# Patient Record
Sex: FEMALE | Race: WHITE | NOT HISPANIC OR LATINO
[De-identification: names, ages, dates, MRNs, and addresses within clinical notes are randomized per-mention and may not be internally consistent; named-entity substitution may affect disease eponyms.]

---

## 2017-02-17 PROBLEM — Z00.00 ENCOUNTER FOR PREVENTIVE HEALTH EXAMINATION: Status: ACTIVE | Noted: 2017-02-17

## 2017-03-01 ENCOUNTER — APPOINTMENT (OUTPATIENT)
Dept: SURGERY | Facility: CLINIC | Age: 67
End: 2017-03-01

## 2017-03-07 ENCOUNTER — OUTPATIENT (OUTPATIENT)
Dept: OUTPATIENT SERVICES | Facility: HOSPITAL | Age: 67
LOS: 1 days | End: 2017-03-07

## 2017-03-07 VITALS
SYSTOLIC BLOOD PRESSURE: 150 MMHG | RESPIRATION RATE: 16 BRPM | HEART RATE: 76 BPM | DIASTOLIC BLOOD PRESSURE: 78 MMHG | HEIGHT: 70 IN | TEMPERATURE: 99 F | WEIGHT: 293 LBS

## 2017-03-07 DIAGNOSIS — N64.52 NIPPLE DISCHARGE: ICD-10-CM

## 2017-03-07 DIAGNOSIS — K21.9 GASTRO-ESOPHAGEAL REFLUX DISEASE WITHOUT ESOPHAGITIS: ICD-10-CM

## 2017-03-07 DIAGNOSIS — Z01.818 ENCOUNTER FOR OTHER PREPROCEDURAL EXAMINATION: ICD-10-CM

## 2017-03-07 DIAGNOSIS — K60.0 ACUTE ANAL FISSURE: Chronic | ICD-10-CM

## 2017-03-07 DIAGNOSIS — Z90.49 ACQUIRED ABSENCE OF OTHER SPECIFIED PARTS OF DIGESTIVE TRACT: Chronic | ICD-10-CM

## 2017-03-07 DIAGNOSIS — Z98.890 OTHER SPECIFIED POSTPROCEDURAL STATES: Chronic | ICD-10-CM

## 2017-03-07 DIAGNOSIS — I10 ESSENTIAL (PRIMARY) HYPERTENSION: ICD-10-CM

## 2017-03-07 DIAGNOSIS — E66.9 OBESITY, UNSPECIFIED: ICD-10-CM

## 2017-03-07 DIAGNOSIS — E11.9 TYPE 2 DIABETES MELLITUS WITHOUT COMPLICATIONS: ICD-10-CM

## 2017-03-07 DIAGNOSIS — S02.401B: Chronic | ICD-10-CM

## 2017-03-07 NOTE — H&P PST ADULT - PMH
DM (diabetes mellitus)  Type II  GERD (gastroesophageal reflux disease)    HTN (hypertension)    Hyperlipidemia    OAB (overactive bladder)    Obesity  Morbid  Psoriatic arthritis  Elbows, fingers

## 2017-03-07 NOTE — H&P PST ADULT - NEGATIVE ENMT SYMPTOMS
no sinus symptoms/no nose bleeds/no vertigo/no nasal discharge/no tinnitus/no ear pain/no nasal congestion/no hearing difficulty/no abnormal taste sensation

## 2017-03-07 NOTE — H&P PST ADULT - HISTORY OF PRESENT ILLNESS
68 y/o obese female with hx HTN, Hyperlipidemia, DM Type II, Gerd, presents for left breast major duct excision on 03/14/17. Pt states she was on vacation last December when she noticed left nipple discharge. Pt had Mammogram, sonogram, & MRI done. Pt was referred for further evaluation, seen by Dr Matute & surgery recommended.

## 2017-03-07 NOTE — H&P PST ADULT - PROBLEM SELECTOR PLAN 1
scheduled for left breast major duct excision  Pending medical clearance  fro pcp  preop instructions & surgical scrub given & explained, pt verbalized understanding

## 2017-03-07 NOTE — H&P PST ADULT - NEGATIVE CARDIOVASCULAR SYMPTOMS
no palpitations/no orthopnea/no chest pain/no paroxysmal nocturnal dyspnea/no claudication/no peripheral edema/no dyspnea on exertion

## 2017-03-07 NOTE — H&P PST ADULT - FAMILY HISTORY
<<-----Click on this checkbox to enter Family History Family history of premature CAD     Father  Still living? No  Family history of early CAD, Age at diagnosis: 41-50     Mother  Still living? Yes, Estimated age: Age Unknown  Family history of sarcoidosis, Age at diagnosis: Age Unknown  Family history of diabetes mellitus (DM), Age at diagnosis: Age Unknown

## 2017-03-07 NOTE — H&P PST ADULT - RS GEN PE MLT RESP DETAILS PC
clear to auscultation bilaterally/breath sounds equal/good air movement/airway patent/no rales/respirations non-labored/no wheezes/no rhonchi

## 2017-03-07 NOTE — H&P PST ADULT - PSH
Acute anal fissure  Repair   delivery delivered  x 2  History of D&C  x 2  Open maxillary fracture  Osteotomy - 44 years ago  S/P laparoscopic cholecystectomy

## 2017-03-07 NOTE — H&P PST ADULT - NSANTHOSAYNRD_GEN_A_CORE
No. CODY screening performed.  STOP BANG Legend: 0-2 = LOW Risk; 3-4 = INTERMEDIATE Risk; 5-8 = HIGH Risk

## 2017-03-20 ENCOUNTER — RESULT REVIEW (OUTPATIENT)
Age: 67
End: 2017-03-20

## 2017-03-20 RX ORDER — SODIUM CHLORIDE 9 MG/ML
1000 INJECTION, SOLUTION INTRAVENOUS
Qty: 0 | Refills: 0 | Status: DISCONTINUED | OUTPATIENT
Start: 2017-03-21 | End: 2017-04-05

## 2017-03-21 ENCOUNTER — OUTPATIENT (OUTPATIENT)
Dept: OUTPATIENT SERVICES | Facility: HOSPITAL | Age: 67
LOS: 1 days | Discharge: HOME | End: 2017-03-21
Payer: COMMERCIAL

## 2017-03-21 ENCOUNTER — APPOINTMENT (OUTPATIENT)
Dept: SURGERY | Facility: HOSPITAL | Age: 67
End: 2017-03-21

## 2017-03-21 VITALS
WEIGHT: 293 LBS | HEIGHT: 70 IN | HEART RATE: 88 BPM | OXYGEN SATURATION: 96 % | TEMPERATURE: 98 F | DIASTOLIC BLOOD PRESSURE: 70 MMHG | RESPIRATION RATE: 16 BRPM | SYSTOLIC BLOOD PRESSURE: 160 MMHG

## 2017-03-21 VITALS
OXYGEN SATURATION: 95 % | TEMPERATURE: 98 F | HEART RATE: 43 BPM | RESPIRATION RATE: 17 BRPM | DIASTOLIC BLOOD PRESSURE: 77 MMHG | SYSTOLIC BLOOD PRESSURE: 114 MMHG

## 2017-03-21 DIAGNOSIS — S02.401B: Chronic | ICD-10-CM

## 2017-03-21 DIAGNOSIS — N64.52 NIPPLE DISCHARGE: ICD-10-CM

## 2017-03-21 DIAGNOSIS — Z90.49 ACQUIRED ABSENCE OF OTHER SPECIFIED PARTS OF DIGESTIVE TRACT: Chronic | ICD-10-CM

## 2017-03-21 DIAGNOSIS — Z98.890 OTHER SPECIFIED POSTPROCEDURAL STATES: Chronic | ICD-10-CM

## 2017-03-21 DIAGNOSIS — K60.0 ACUTE ANAL FISSURE: Chronic | ICD-10-CM

## 2017-03-21 PROCEDURE — 19120K: CUSTOM | Mod: LT

## 2017-03-21 PROCEDURE — 88305 TISSUE EXAM BY PATHOLOGIST: CPT | Mod: 26

## 2017-03-21 RX ORDER — METHOTREXATE 2.5 MG/1
7 TABLET ORAL
Qty: 0 | Refills: 0 | COMMUNITY

## 2017-03-21 RX ORDER — SODIUM CHLORIDE 9 MG/ML
1000 INJECTION, SOLUTION INTRAVENOUS
Qty: 0 | Refills: 0 | Status: DISCONTINUED | OUTPATIENT
Start: 2017-03-21 | End: 2017-03-21

## 2017-03-21 RX ORDER — ACETAMINOPHEN 500 MG
2 TABLET ORAL
Qty: 0 | Refills: 0 | COMMUNITY

## 2017-03-21 RX ORDER — SITAGLIPTIN 50 MG/1
1 TABLET, FILM COATED ORAL
Qty: 0 | Refills: 0 | COMMUNITY

## 2017-03-21 RX ORDER — ROSUVASTATIN CALCIUM 5 MG/1
1 TABLET ORAL
Qty: 0 | Refills: 0 | COMMUNITY

## 2017-03-21 RX ORDER — CANDESARTAN CILEXETIL 8 MG/1
1 TABLET ORAL
Qty: 0 | Refills: 0 | COMMUNITY

## 2017-03-21 RX ORDER — OMEPRAZOLE 10 MG/1
1 CAPSULE, DELAYED RELEASE ORAL
Qty: 0 | Refills: 0 | COMMUNITY

## 2017-03-21 RX ORDER — SOLIFENACIN SUCCINATE 10 MG/1
1 TABLET ORAL
Qty: 0 | Refills: 0 | COMMUNITY

## 2017-03-21 RX ORDER — METFORMIN HYDROCHLORIDE 850 MG/1
1 TABLET ORAL
Qty: 0 | Refills: 0 | COMMUNITY

## 2017-03-21 RX ORDER — FOLIC ACID 0.8 MG
1 TABLET ORAL
Qty: 0 | Refills: 0 | COMMUNITY

## 2017-03-21 RX ADMIN — SODIUM CHLORIDE 30 MILLILITER(S): 9 INJECTION, SOLUTION INTRAVENOUS at 14:23

## 2017-03-21 NOTE — ASU DISCHARGE PLAN (ADULT/PEDIATRIC). - MEDICATION SUMMARY - MEDICATIONS TO TAKE
I will START or STAY ON the medications listed below when I get home from the hospital:    Tylenol Arthritis Caplet  -- 2 tab(s) by mouth 2 times a day. AM / PM  -- Indication: For home med    candesartan 32 mg oral tablet  -- 1 tab(s) by mouth once a day. AM  -- Indication: For home med    Januvia 100 mg oral tablet  -- 1 tab(s) by mouth once a day. AM  -- Indication: For home med    metFORMIN 500 mg oral tablet  -- 1 tab(s) by mouth 2 times a day  -- Indication: For home med    Crestor 5 mg oral tablet  -- 1 tab(s) by mouth once a day (at bedtime)  -- Indication: For home med    Ziac 10 mg-6.25 mg oral tablet  -- 1 tab(s) by mouth once a day.  AM  -- Indication: For home med    methotrexate 2.5 mg oral tablet  -- 7 tab(s) by mouth once a week. Tuesdays  -- Indication: For home med    omeprazole 20 mg oral delayed release capsule  -- 1 cap(s) by mouth once a day. HS  -- Indication: For home med    VESIcare 5 mg oral tablet  -- 1 tab(s) by mouth once a day. AM  -- Indication: For home med    multivitamin  -- 1 tab(s) by mouth once a day. Last day 3/7/17  -- Indication: For home med    folic acid 1 mg oral tablet  -- 1 tab(s) by mouth once a day  -- Indication: For home med

## 2017-03-21 NOTE — ASU DISCHARGE PLAN (ADULT/PEDIATRIC). - NURSING INSTRUCTIONS
Wear bra for comfort and support (24/7). Use ice to reduce pain and swelling. Remove dressing as instructed by your doctor. Leave steri strips intact.

## 2017-03-21 NOTE — ASU DISCHARGE PLAN (ADULT/PEDIATRIC). - NOTIFY
Fever greater than 101/Bleeding that does not stop Swelling that continues/Pain not relieved by Medications/Increased Irritability or Sluggishness/Inability to Tolerate Liquids or Foods/Excessive Diarrhea/Persistent Nausea and Vomiting/Unable to Urinate/Bleeding that does not stop/Numbness, color, or temperature change to extremity/Fever greater than 101/Numbness, tingling

## 2017-03-27 ENCOUNTER — APPOINTMENT (OUTPATIENT)
Dept: SURGERY | Facility: CLINIC | Age: 67
End: 2017-03-27

## 2017-05-10 ENCOUNTER — APPOINTMENT (OUTPATIENT)
Dept: SURGERY | Facility: CLINIC | Age: 67
End: 2017-05-10

## 2019-11-08 NOTE — ASU PREOP CHECKLIST - VERIFY SURGICAL SITE/SIDE WITH PATIENT

## 2019-11-24 NOTE — H&P PST ADULT - NSANTHAGERD_ENT_A_CORE
Allan Cortes MD  Go to the Emergency Department for any problems  Call your PCP for follow up next available.    Acute Otitis Media with Infection (Child)    Your child has a middle ear infection (acute otitis media). It is caused by bacteria or fungi. The middle ear is the space behind the eardrum. The eustachian tube connects the ear to the nasal passage. The eustachian tubes help drain fluid from the ears. They also keep the air pressure equal inside and outside the ears. These tubes are shorter and more horizontal in children. This makes it more likely for the tubes to become blocked. A blockage lets fluid and pressure build up in the middle ear. Bacteria or fungi can grow in this fluid and cause an ear infection. This infection is commonly known as an earache.  The main symptom of an ear infection is ear pain. Other symptoms may include pulling at the ear, being more fussy than usual, decreased appetite, and vomiting or diarrhea. Your childs hearing may also be affected. Your child may have had a respiratory infection first.  An ear infection may clear up on its own. Or your child may need to take medicine. After the infection goes away, your child may still have fluid in the middle ear. It may take weeks or months for this fluid to go away. During that time, your child may have temporary hearing loss. But all other symptoms of the earache should be gone.  Home care  Follow these guidelines when caring for your child at home:  · The healthcare provider will likely prescribe medicines for pain. The provider may also prescribe antibiotics or antifungals to treat the infection. These may be liquid medicines to give by mouth. Or they may be ear drops. Follow the providers instructions for giving these medicines to your child.  · Because ear infections can clear up on their own, the provider may suggest waiting for a few days before giving your child medicines for infection.  · To reduce pain, have your child  rest in an upright position. Hot or cold compresses held against the ear may help ease pain.  · Keep the ear dry. Have your child wear a shower cap when bathing.  To help prevent future infections:  · Avoid smoking near your child. Secondhand smoke raises the risk for ear infections in children.  · Make sure your child gets all appropriate vaccines.  · Do not bottle-feed while your baby is lying on his or her back. (This position can cause middle ear infections because it allows milk to run into the eustachian tubes.)      · If you breastfeed, continue until your child is 6 to 12 months of age.  To apply ear drops:  1. Put the bottle in warm water if the medicine is kept in the refrigerator. Cold drops in the ear are uncomfortable.  2. Have your child lie down on a flat surface. Gently hold your childs head to one side.  3. Remove any drainage from the ear with a clean tissue or cotton swab. Clean only the outer ear. Dont put the cotton swab into the ear canal.  4. Straighten the ear canal by gently pulling the earlobe up and back.  5. Keep the dropper a half-inch above the ear canal. This will keep the dropper from becoming contaminated. Put the drops against the side of the ear canal.  6. Have your child stay lying down for 2 to 3 minutes. This gives time for the medicine to enter the ear canal. If your child doesnt have pain, gently massage the outer ear near the opening.  7. Wipe any extra medicine away from the outer ear with a clean cotton ball.  Follow-up care  Follow up with your childs healthcare provider as directed. Your child will need to have the ear rechecked to make sure the infection has resolved. Check with your doctor to see when they want to see your child.  Special note to parents  If your child continues to get earaches, he or she may need ear tubes. The provider will put small tubes in your childs eardrum to help keep fluid from building up. This procedure is a simple and works well.  When  to seek medical advice  Unless advised otherwise, call your child's healthcare provider if:  · Your child is 3 months old or younger and has a fever of 100.4°F (38°C) or higher. Your child may need to see a healthcare provider.  · Your child is of any age and has fevers higher than 104°F (40°C) that come back again and again.  Call your child's healthcare provider for any of the following:  · New symptoms, especially swelling around the ear or weakness of face muscles  · Severe pain  · Infection seems to get worse, not better   · Neck pain  · Your child acts very sick or not himself or herself  · Fever or pain do not improve with antibiotics after 48 hours  Date Last Reviewed: 5/3/2015  © 3610-3360 [employer]. [address] [place] [address], [place] [address]. All rights reserved. This information is not intended as a substitute for professional medical care. Always follow your healthcare professional's instructions.        Diet for Vomiting and Diarrhea (Child)  Vomiting and diarrhea are common in children. A child can quickly lose too much fluid and become dehydrated. This is the loss of too much water and minerals from the body. This can be serious and even life-threatening. When this occurs, body fluids must be replaced. This is done by giving small amounts of liquids often.  If your child shows signs of dehydration, the doctor may tell you to use an oral rehydration solution. Oral rehydration solution can replace lost minerals called electrolytes. Oral rehydration solution can be used in addition to breast or bottle feedings. Oral rehydration solution may also reduce vomiting and diarrhea. You can buy oral rehydration solution at grocery stores and drug stores without a prescription.   In cases of severe dehydration or vomiting, a child may need to go to a hospital to have intravenous (IV) fluids.  Giving liquids and food  If using oral rehydration solution:  · Follow your doctors instructions when  giving the solution to your child.  · Use only prepared, purchased oral rehydration solution made for this purpose. Don't make your own solution. This is very important because the homemade solutions and sports drinks may not contain the amounts or ingredients necessary to stop dehydration.  · If vomiting or diarrhea gets better after 2 to 3 hours, you can stop oral rehydration solution. You can then restart other clear liquids.  For solid foods:  · Follow the diet your doctor advises.  · If desired and tolerated, your child may eat regular food.  · If your child is an infant and you are breastfeeding, continue to do so unless your healthcare provider directs you stop. If you are feeding formula to your infant, you may try a special oral rehydration solution in small amounts frequently for a few hours. When the vomiting improves, you may restart the formula.  · If unable to eat regular food, your child can drink clear liquids such as water, or suck on ice cubes. Do not give high-sugar fluids such as juice or soda.  · If clear liquids are tolerated, slowly increase the amount. Alternate these fluids with oral rehydration solution as your doctor advises.  · Your child can start a regular diet 12 to 24 hours after diarrhea or vomiting has stopped. Continue to give plenty of clear liquids.  · You can resume your child's normal diet over time as he or she feels better. Dont force your child to eat, especially if he or she is having stomach pain or cramping. Dont feed your child large amounts at a time, even if he or she is hungry. This can make your child feel worse. You can give your child more food over time if he or she can tolerate it. Foods you can give include cereal, mashed potatoes, applesauce, mashed bananas, crackers, dry toast, rice, oatmeal, bread, noodles, pretzels, soups with rice or noodles, and cooked vegetables. As your child improves, you may try lean meats and yogurt.  · If the symptoms come back, go  back to a simple diet or clear liquids.  Follow-up care  Follow up with your childs healthcare provider, or as advised. If a stool sample was taken or cultures were done, call the healthcare provider for the results as instructed.  Call 911  Call 911 if your child has any of these symptoms:  · Trouble breathing  · Confusion  · Extreme drowsiness or trouble walking  · Loss of consciousness  · Rapid heart rate  · Stiff neck  · Seizure  When to seek medical advice  Call your childs healthcare provider right away if any of these occur:  · Abdominal pain that gets worse  · Constant lower right abdominal pain  · Repeated vomiting after the first 2 hours on liquids  · Occasional vomiting for more than 24 hours  · Continued severe diarrhea for more than 24 hours  · Blood in vomit or stool  · Reduced oral intake  · Dark urine or no urine for 4 to 6 hours in infants and young children, or 6 for 8 hours in older children, no tears when crying, sunken eyes, or dry mouth  · Fussiness or crying that cannot be soothed  · Unusual drowsiness  · New rash  · More than 8 diarrhea stools within 8 hours  · Diarrhea lasts more than 1 week on antibiotics  · A child 2 years or older has a fever for more than 3 days  · A child of any age has repeated fevers above 104°F (40°C)  Date Last Reviewed: 12/13/2015 © 2000-2017 [employer]. [address] [place] [address], [place] [address]. Todos los derechos reservados. Esta información no pretende sustituir la atención médica profesional. Sólo lawler médico puede diagnosticar y tratar un problema de rosario.        When Your Child Has Acute Bronchitis     A healthy airway allows a clear passage for air.     Acute bronchitis occurs when the bronchial tubes (airways in the lungs) become infected or inflamed. Normally, air moves in and out of these airways easily. When a child has acute bronchitis, the tubes become narrowed, making it harder for air to flow in and out of the lungs. This causes  shortness of breath and coughing or wheezing. Acute bronchitis usually goes away without treatment in a few days to a few weeks.  What causes acute bronchitis?  · A cold or the flu (most cases)  · A bacterial infection  · Exposure to irritants such as tobacco smoke, smog, and household   · Other respiratory problems, such as asthma  What are the symptoms of acute bronchitis?     An inflamed airway blocks airflow.     Acute bronchitis usually comes on suddenly, often after a cold or flu. Symptoms include:  · Noisy breathing or wheezing  · Mucus buildup in the airways and lungs  · Slight fever and chills  · Chest retractions (sucking in of the skin around the ribs when your child inhales, a sign of difficult breathing)  · Coughing up yellowish-gray or green mucus  How is acute bronchitis diagnosed?  Your childs health history, a physical exam, and certain tests can help your childs healthcare provider diagnose bronchitis. During the exam, the provider will listen to your childs chest and check his or her ears, nose, and throat. One or more of these tests may also be done:  · Sputum culture: Fluid from your childs lungs may be checked for bacteria. Not routinely done because it is hard to get in children.  · Chest X-ray: Your child may have a chest X-ray to look for pneumonia (bacterial infection in the lungs).  · Other tests: Your childs healthcare provider may order other tests to check for underlying problems such as allergies or asthma. Your child may be referred to a specialist for these tests.  How is acute bronchitis treated?  The best treatment for acute bronchitis is to ease symptoms. Antibiotics are usually not helpful because viruses cause most cases of acute bronchitis. To help your child feel more comfortable:  · Give your child plenty of fluids, such as water, juice, or warm soup. Fluids loosen mucus, helping your child breathe more easily. They also prevent dehydration.  · Make sure your  child gets plenty of rest.  · Keep your house smoke-free.  · Use childrens strength medicine for symptoms. Discuss all over-the-counter products with the doctor before using them, including cough syrup. The U.S. Food and Drug Administration (FDA) does not recommend using cough or cold medicine in children under 4 years of age. Use caution when giving these medicines to kids between the ages of 4 and 11 years.  · Never give a child under age 18 aspirin to treat a fever unless your healthcare provider says its OK. (It could cause a rare but serious condition called Reyes syndrome.)  · Never give ibuprofen to an infant 6 months of age or younger.  If antibiotics are prescribed  Your childs healthcare provider will prescribe antibiotics only if your child has a bacterial infection. In that case:  · Make sure your child takes ALL the medicine, even if he or she feels better. Otherwise, the infection may come back.  · Be sure that your child takes the medicine as directed. For example, some antibiotics should be taken with food.  · Ask your childs healthcare provider or pharmacist what side effects the medicine may cause and what to do about them.  Preventing future infections  To help your child stay healthy:  · Teach children to wash their hands often. Its the best way to prevent most infections.  · Dont let anyone smoke in your house or around your child.  · Consider having children ages 6 months to 18 years get a flu shot each year. The shot is recommended for young children because they are especially at risk of flu, which can lead to bronchitis.  Tips for proper handwashing  Use warm water and plenty of soap. Work up a good lather.  · Clean the whole hand, under the nails, between fingers, and up the wrists.  · Wash for at least 20 seconds (as long as it takes to say the ABCs or sing Happy Birthday). Dont just wipe--scrub well.  · Rinse well. Let the water run down the fingers, not up the wrists.  · In a  public restroom, use a paper towel to turn off the faucet and open the door.  When to seek medical care   Call the healthcare provider if your otherwise healthy child has:  · Symptoms that get worse, or new symptoms  · Trouble breathing  · Retractions (skin sucking in around the ribs when your child inhales)  · Symptoms that dont start to improve within a week, or within 3 days of taking antibiotics  · Recurring bronchial infections  Unless advised otherwise by your childs healthcare provider, call the provider right away if:  · Your child is of any age and has repeated fevers above 104°F (40°C).  · Your child is younger than 2 years of age and a fever of 100.4°F (38°C) continues for more than 1 day.  · Your child is 2 years old or older and a fever of 100.4°F (38°C) continues for more than 3 days.   Date Last Reviewed: 1/1/2017  © 0954-3109 [employer]. [address] [place] [address], [place] [address]. All rights reserved. This information is not intended as a substitute for professional medical care. Always follow your healthcare professional's instructions.         Yes

## 2020-07-21 ENCOUNTER — TRANSCRIPTION ENCOUNTER (OUTPATIENT)
Age: 70
End: 2020-07-21

## 2021-05-06 ENCOUNTER — TRANSCRIPTION ENCOUNTER (OUTPATIENT)
Age: 71
End: 2021-05-06

## 2021-11-24 ENCOUNTER — TRANSCRIPTION ENCOUNTER (OUTPATIENT)
Age: 71
End: 2021-11-24

## 2022-06-19 ENCOUNTER — NON-APPOINTMENT (OUTPATIENT)
Age: 72
End: 2022-06-19

## 2023-02-20 NOTE — H&P PST ADULT - NEGATIVE NEUROLOGICAL SYMPTOMS
Girish Max(Resident) no difficulty walking/no tremors/no weakness/no vertigo/no focal seizures/no syncope

## 2024-08-02 NOTE — ASU PATIENT PROFILE, ADULT - NS PRO PT RIGHT SUPPORT PERSON
Health Maintenance       Depression Screening (Yearly)  Never done    Pneumococcal Vaccine 0-64 (2 of 2 - PCV)  Overdue since 6/3/2020    DTaP/Tdap/Td Vaccine (7 - Td or Tdap)  Overdue since 2/9/2021    COVID-19 Vaccine (1 - 2023-24 season)  Never done           Following review of the above:  Patient wishes to discuss with clinician: Dtap/Tdap/Td and Pneumococcal  Patient is not proceeding with: COVID-19    Note: Refer to final orders and clinician documentation.       same name as above

## 2025-04-11 NOTE — ASU PATIENT PROFILE, ADULT - NSCAFFEAMTFREQ_GEN_ALL_CORE_SD
Encounter Date: 4/11/2025       History     Chief Complaint   Patient presents with    Tachycardia     Sent from echo clinic for tachycardia, HR 160s, per clinic staff pt aflutter on EKG, pt asymptomatic, clinic states to keep patient NPO for possible cardioversion today     HPI  79-year-old female with a past medical history of hypothyroidism, mitral valve prolapse who presents with tachycardia.  She also has a history of breast cancer status post partial mastectomy in October, radiation in December, adjuvant endocrine therapy which has been stopped.  In February she had hip surgery which she has recovered well from.  More recently she has had some issues with her sinus.  She reports shortness of breath and sinus congestion.  She is treated with levofloxacin by her ENT and also had a tube placed in her left ear last week.  She went to clinic today for follow up with breast surgery as well as heme Onc.  She is found to be tachycardic to the 160s and was sent here for further evaluation.  She reports that her Apple watch has been telling her that she has had an elevated heart rate for several weeks but she thought it was an issue with the I have a watch.  She has not felt any palpitations.  She denies any chest pain.  No further shortness of breath or orthopnea.  No fevers or chills.  She does report bilateral leg swelling which is new.  No recent vomiting or diarrhea.  No history of AFib.  No new medications.  Review of patient's allergies indicates:   Allergen Reactions    Penicillin Hives and Rash     Other reaction(s): in high school, Skin Rashes/Hives     Past Medical History:   Diagnosis Date    Absence of both cervix and uterus, acquired     TVH    Basal cell carcinoma (BCC)     1990's    Breast cancer     Lumpectomy 9/4/2024 right breast [facility] Lumpectomy left breast 10/3/2024 [facility]    Breast cyst     Cancer     right breast    H/O bone density study 07/21/2014    Normal    H/O colonoscopy 2005    Cologard done  10/2016, Negative    H/O mammogram 2015    Normal      Herpes simplex virus (HSV) infection     Hypothyroidism associated with surgical procedure     Migraines     MVP (mitral valve prolapse)     Osteoarthritis     Overweight     Thyroid disease     Vaginal Pap smear 2006    Normal     Past Surgical History:   Procedure Laterality Date    ARTHROPLASTY OF HIP BY POSTERIOR APPROACH Left 2/10/2025    Procedure: ARTHROPLASTY, HIP, TOTAL, POSTERIOR APPROACH;  Surgeon: Dionicio Fonseca MD;  Location: [facility];  Service: Orthopedics;  Laterality: Left;  OFIRMEV    ARTHROPLASTY, KNEE, TOTAL, SIGHT ASSISTED Right 06/28/2021    Procedure: ARTHROPLASTY, KNEE, TOTAL, SIGHT ASSISTED;  Surgeon: Dionicio Fonseca MD;  Location: [facility];  Service: Orthopedics;  Laterality: Right;    BREAST CYST ASPIRATION      CHOLECYSTECTOMY  2003    COLONOSCOPY      DILATION AND CURETTAGE OF UTERUS  1979    Missed Ab    HYSTERECTOMY  1986    TVH - menorrhagia    INJECTION FOR SENTINEL NODE IDENTIFICATION Bilateral 10/9/2024    Procedure: INJECTION, FOR SENTINEL NODE IDENTIFICATION;  Surgeon: Sarah Mims MD;  Location: [facility];  Service: General;  Laterality: Bilateral;    JOINT REPLACEMENT      left knee    KNEE SURGERY Left 2013    Replacement - In [place]    MASTECTOMY, PARTIAL Bilateral 10/9/2024    Procedure: MASTECTOMY, PARTIAL BILATERAL;  Surgeon: Sarah Mims MD;  Location: [facility];  Service: General;  Laterality: Bilateral;    SENTINEL LYMPH NODE BIOPSY Bilateral 10/9/2024    Procedure: BIOPSY, LYMPH NODE, SENTINEL;  Surgeon: Sarah Mims MD;  Location: [facility];  Service: General;  Laterality: Bilateral;    THYROIDECTOMY  2013    Partial 1973    TONSILLECTOMY      TYMPANOSTOMY TUBE PLACEMENT Left      Family History   Problem Relation Name Age of Onset    Cirrhosis Father      Alzheimer's disease Father      Emphysema Father      Pneumonia Mother      Cancer Maternal Aunt      Cancer Maternal Uncle      Cancer Maternal Cousin       Breast cancer Maternal Cousin  30        mastectomy    Cancer Maternal Cousin      Cancer Maternal Cousin      Colon cancer Neg Hx      Ovarian cancer Neg Hx       Social History[1]  Review of Systems    Physical Exam     Initial Vitals [04/11/25 1247]   BP Pulse Resp Temp SpO2   102/69 (!) 128 18 97.6 °F (36.4 °C) 95 %      MAP       --         Physical Exam    Nursing note and vitals reviewed.  Constitutional: She appears well-developed and well-nourished.   HENT:   Head: Normocephalic.   Nose: Nose normal.   Eyes: Conjunctivae and EOM are normal. Pupils are equal, round, and reactive to light.   Neck:   Normal range of motion.  Cardiovascular:  Regular rhythm and normal heart sounds.     Exam reveals no gallop and no friction rub.       No murmur heard.  Tachycardic   Pulmonary/Chest: Breath sounds normal. No respiratory distress. She has no wheezes. She has no rhonchi. She has no rales.   Abdominal: Abdomen is soft. She exhibits no distension. There is no abdominal tenderness. There is no rebound and no guarding.   Musculoskeletal:         General: Normal range of motion.      Cervical back: Normal range of motion.      Comments: Bilateral 1+ lower extremity non pitting edema in the ankles     Neurological: She is alert and oriented to person, place, and time. She has normal strength.   Skin: Skin is warm and dry. Capillary refill takes less than 2 seconds.   Psychiatric: She has a normal mood and affect.         ED Course   Procedures  Labs Reviewed   B-TYPE NATRIURETIC PEPTIDE - Abnormal       Result Value     (*)    CBC WITH DIFFERENTIAL - Abnormal    WBC 8.84      RBC 4.56      HGB 14.9      HCT 45.6       (*)     MCH 32.7 (*)     MCHC 32.7      RDW 13.2      Platelet Count 212      MPV 11.9      Nucleated RBC 0      Neut % 65.8      Lymph % 19.5      Mono % 11.2      Eos % 1.9      Basophil % 0.8      Imm Grans % 0.8 (*)     Neut # 5.82      Lymph # 1.72      Mono # 0.99      Eos # 0.17       Baso # 0.07      Imm Grans # 0.07 (*)    COMPREHENSIVE METABOLIC PANEL - Normal    Sodium 137      Potassium 4.8      Chloride 103      CO2 25      Glucose 95      BUN 14      Creatinine 0.8      Calcium 8.9      Protein Total 6.6      Albumin 4.0      Bilirubin Total 0.7            AST 18      ALT 27      Anion Gap 9      eGFR >60     TROPONIN I HIGH SENSITIVITY - Normal    Troponin High Sensitive <3     TSH - Normal    TSH 3.417     MAGNESIUM - Normal    Magnesium  1.9     CBC W/ AUTO DIFFERENTIAL    Narrative:     The following orders were created for panel order CBC auto differential.  Procedure                               Abnormality         Status                     ---------                               -----------         ------                     CBC with Differential[[id number]]       Abnormal            Final result                 Please view results for these tests on the individual orders.          Imaging Results              X-Ray Chest AP Portable (Final result)  Result time 04/11/25 14:11:59      Final result by Colby Shrap MD (04/11/25 14:11:59)                   Impression:      See above      Electronically signed by: Colby Sharp MD  Date:    04/11/2025  Time:    14:11               Narrative:    EXAMINATION:  XR CHEST AP PORTABLE    CLINICAL HISTORY:  ?fluid overload;    TECHNIQUE:  Single frontal view of the chest was performed.    COMPARISON:  N 02/12/2021 one    FINDINGS:  Heart size normal.  No significant airspace consolidation or pleural effusion identified.  Mild accentuation of the basilar interstitial markings.                                       Medications   metoprolol injection 5 mg (5 mg Intravenous Given 4/11/25 1346)   diltiaZEM injection 10 mg (10 mg Intravenous Given 4/11/25 1503)     Medical Decision Making  79-year-old female who presents with tachycardia.  It sounds like her heart rate has been elevated for several weeks.  Other than mild lower extremity  1-2 cups/cans per day swelling she is asymptomatic.  No history of atrial fibrillation or a flutter.  Overall she is well-appearing.  She has a soft blood pressure but is otherwise awake, alert, nontoxic.  EKG read sinus tachycardia however suspect more likely it is a flutter with 2-1 block.  As she has been in it for an unknown amount of time, likely more than 48 hours, she is not a candidate for cardioversion at this time.  We will need wilber to rule out clot.  We will try to rate control here, give a small amount of fluids, check labs.  Dispo pending    Labs show a mildly elevated BNP, otherwise normal.  Chest x-ray with faint pulmonary congestion.  She is given IV metoprolol with no change in her heart rate.  Then given IV Dilt with absolutely no change in heart rate.  Still consistent with atrial flutter.  As we can not rate control her and can not cardiovert her here, we will admit to hospital medicine for further management.  Discussed admission with Hospital Medicine.    Amount and/or Complexity of Data Reviewed  Labs: ordered.  Radiology: ordered.  ECG/medicine tests: ordered and independent interpretation performed.     Details: Regular, rate 130, diffuse nonspecific ST changes likely related to rate, EKG read sinus tachycardia however more likely a flutter with 2-1 block.    Risk  Prescription drug management.                                      Clinical Impression:  Final diagnoses:  [R00.0] Tachycardia  [I48.3] Typical atrial flutter (Primary)                     [1]   Social History  Tobacco Use    Smoking status: Never    Smokeless tobacco: Never   Substance Use Topics    Alcohol use: Yes     Alcohol/week: 7.0 standard drinks of alcohol     Types: 7 Glasses of wine per week     Comment: Socially - Wine    Drug use: No        Lena Jackson MD  04/11/25 3324